# Patient Record
Sex: MALE | Race: OTHER | HISPANIC OR LATINO | Employment: FULL TIME | URBAN - METROPOLITAN AREA
[De-identification: names, ages, dates, MRNs, and addresses within clinical notes are randomized per-mention and may not be internally consistent; named-entity substitution may affect disease eponyms.]

---

## 2018-06-03 ENCOUNTER — OFFICE VISIT (OUTPATIENT)
Dept: URGENT CARE | Facility: CLINIC | Age: 43
End: 2018-06-03

## 2018-06-03 VITALS
SYSTOLIC BLOOD PRESSURE: 126 MMHG | TEMPERATURE: 96.4 F | HEART RATE: 84 BPM | DIASTOLIC BLOOD PRESSURE: 80 MMHG | OXYGEN SATURATION: 98 %

## 2018-06-03 DIAGNOSIS — S61.219A LACERATION WITHOUT FOREIGN BODY OF UNSPECIFIED FINGER WITHOUT DAMAGE TO NAIL, INITIAL ENCOUNTER: Primary | ICD-10-CM

## 2018-06-03 PROCEDURE — 90715 TDAP VACCINE 7 YRS/> IM: CPT

## 2018-06-03 PROCEDURE — 13133 CMPLX RPR F/C/C/M/N/AX/G/H/F: CPT | Performed by: PHYSICIAN ASSISTANT

## 2018-06-03 PROCEDURE — 99204 OFFICE O/P NEW MOD 45 MIN: CPT | Performed by: PHYSICIAN ASSISTANT

## 2018-06-03 PROCEDURE — 13132 CMPLX RPR F/C/C/M/N/AX/G/H/F: CPT | Performed by: PHYSICIAN ASSISTANT

## 2018-06-03 RX ORDER — CEPHALEXIN 500 MG/1
500 CAPSULE ORAL EVERY 8 HOURS SCHEDULED
Qty: 21 CAPSULE | Refills: 0 | Status: SHIPPED | OUTPATIENT
Start: 2018-06-03 | End: 2018-06-10

## 2018-06-03 NOTE — PATIENT INSTRUCTIONS
Keep the stitches clean and dry  Do not get the stitches wet for the 1st 24 hours  The stitches should be covered with a clean dressing daily and a small coating of antibiotic ointment  Watch for signs of infection such as increased swelling increased redness pus from the wound or red streaking  Return in 24 hr for wound check  Start antibiotic and take as directed  Stitches are to be removed in 7-10 days  If you have increased pain in your finger go to the emergency room

## 2018-06-03 NOTE — PROGRESS NOTES
[facility]    NAME: Sanjeev  is a 38 y o  male  : 1975    MRN: [id number]  DATE: Chloé 3, 2018  TIME: 8:32 PM    Assessment and Plan   Laceration without foreign body of unspecified finger without damage to nail, initial encounter [S61 [id number]]  1  Laceration without foreign body of unspecified finger without damage to nail, initial encounter  TDAP Vaccine greater than or equal to 6yo    cephalexin (KEFLEX) 500 mg capsule   Laceration repair  Date/Time: 6/3/2018 6:59 PM  Performed by: aGmal Montalvo  Authorized by: Gamal Montalvo   Consent: Verbal consent obtained  Consent given by: patient  Patient understanding: patient states understanding of the procedure being performed  Patient identity confirmed: verbally with patient  Body area: upper extremity  Location details: right index finger  Laceration length: 9 cm  Foreign bodies: no foreign bodies  Tendon involvement: none  Nerve involvement: none  Vascular damage: no    Anesthesia:  Local Anesthetic: lidocaine 2% without epinephrine  Anesthetic total: 4 mL    Sedation:  Patient sedated: no      Procedure Details:  Preparation: Patient was prepped and draped in the usual sterile fashion  Irrigation solution: saline  Amount of cleaning: standard (with betadine)  Debridement: none  Degree of undermining: none  Skin closure: 5-0 nylon  Number of sutures: 13  Technique: simple  Approximation: close  Approximation difficulty: complex  Dressing: antibiotic ointment and gauze roll (telfa)  Patient tolerance: Patient tolerated the procedure well with no immediate complications                Patient Instructions     Patient Instructions   Keep the stitches clean and dry  Do not get the stitches wet for the 1st 24 hours  The stitches should be covered with a clean dressing daily and a small coating of antibiotic ointment  Watch for signs of infection such as increased swelling increased redness pus from the wound or red streaking    Return in 24 hr for wound check  Start antibiotic and take as directed  Stitches are to be removed in 7-10 days  If you have increased pain in your finger go to the emergency room  Chief Complaint     Chief Complaint   Patient presents with    Finger Laceration     right ring finger cut on metal he was cleaning about 30 min ago       History of Present Illness   46-year-old male here with laceration to his right pointer finger  Patient was cleaning a metal object and cut his finger  Has full range of motion of finger  Denies any numbness  Review of Systems   Review of Systems   Constitutional: Negative for activity change, appetite change, chills, diaphoresis, fatigue, fever and unexpected weight change  HENT: Negative for congestion, ear pain, hearing loss, sinus pressure, sneezing, sore throat and tinnitus  Eyes: Negative for photophobia, redness and visual disturbance  Respiratory: Negative for apnea, cough, chest tightness, shortness of breath, wheezing and stridor  Cardiovascular: Negative for chest pain, palpitations and leg swelling  Gastrointestinal: Negative for abdominal distention, abdominal pain, blood in stool, constipation, diarrhea, nausea and vomiting  Endocrine: Negative for cold intolerance, heat intolerance, polydipsia, polyphagia and polyuria  Genitourinary: Negative for difficulty urinating, dysuria, flank pain, hematuria and urgency  Musculoskeletal: Negative for arthralgias, back pain, gait problem, myalgias, neck pain and neck stiffness  Skin: Positive for wound  Negative for rash  Neurological: Negative for dizziness, tremors, seizures, syncope, weakness, light-headedness, numbness and headaches  Hematological: Negative for adenopathy  Does not bruise/bleed easily  Psychiatric/Behavioral: Negative for agitation, behavioral problems, confusion and decreased concentration  The patient is not nervous/anxious      All other systems reviewed and are negative  Current Medications     Current Outpatient Prescriptions:     cephalexin (KEFLEX) 500 mg capsule, Take 1 capsule (500 mg total) by mouth every 8 (eight) hours for 7 days, Disp: 21 capsule, Rfl: 0    Current Allergies     Allergies as of 06/03/2018    (No Known Allergies)          The following portions of the patient's history were reviewed and updated as appropriate: allergies, current medications, past family history, past medical history, past social history, past surgical history and problem list    No past medical history on file  No past surgical history on file  No family history on file  Medications have been verified  Objective   /80   Pulse 84   Temp (!) 96 4 °F (35 8 °C) (Tympanic)   SpO2 98%      Physical Exam   Physical Exam   Constitutional: He appears well-developed and well-nourished  Cardiovascular: Normal rate and regular rhythm  Pulmonary/Chest: Effort normal    Musculoskeletal:        Arms:  Skin:   Laceration does not extend into nail bed

## 2018-06-04 ENCOUNTER — OFFICE VISIT (OUTPATIENT)
Dept: URGENT CARE | Facility: CLINIC | Age: 43
End: 2018-06-04

## 2018-06-04 VITALS
HEART RATE: 83 BPM | TEMPERATURE: 97.4 F | SYSTOLIC BLOOD PRESSURE: 141 MMHG | OXYGEN SATURATION: 98 % | DIASTOLIC BLOOD PRESSURE: 86 MMHG

## 2018-06-04 DIAGNOSIS — Z51.89 ENCOUNTER FOR WOUND RE-CHECK: Primary | ICD-10-CM

## 2018-06-04 PROCEDURE — 99213 OFFICE O/P EST LOW 20 MIN: CPT | Performed by: NURSE PRACTITIONER

## 2018-06-04 NOTE — PROGRESS NOTES
S- 82-year-old male here for wound check  Status post laceration repair of right 4th finger  He is denying pain  Denies, drainage or swelling  O- Laceration right 4th finger is nicely approximated  No swelling, redness, or drainage noted  A/P- S/P laceration repair- area cleansed with wound   Bacitracin and DSD re-applied  Follow up for wound-check in 2 days

## 2018-06-04 NOTE — PATIENT INSTRUCTIONS
Acute Wound Care   GANGA Cornell & OSMIN Starr: Skin manifestations of running  J Am Acad Dermatol, 2006; 55(2):290-301  JULIANO Aviles, KANE Strickland, & CARLIE Mckenzie: Wound healing: an overview  Plast Reconstr Surg, 2006; 117(7 Suppl):1e-32e  GANGA Jane: Preventing Pressure Ulcers and Skin Tears  In: Yudi Nixon , eds  Geriatric Nursing Protocols for Best Practice  [place], [place]: [employer];, 2003  Navya Carrillo R, et al: The clinical efficacy and cost effectiveness of the vacuum-assisted closure technique in the management of acute and chronic wounds: a randomized controlled trial  Plast Reconstr Surg, 2006; 118(2):390-397  ArchieS, KANE Paige, & RomainKG: Recent advances and emerging treatments  BMJ, 2006; 332(3642):962-965  LETTY Smith & TuanWT: Wound dressings  Surg Clin Trino Blade, 2003; 61(4):061-783  PRODIGY: Lacerations  Available at: https://Tok[employer]/  nhs uk/lacerations/view_whole_guidance , Accessed September 27, 2006  Small,V: Management of cuts, abrasions and lacerations  Nurs Stand, 2000; 15(5):41-44  © 2016 1872 Tawnya Marroquin is for End User's use only and may not be sold, redistributed or otherwise used for commercial purposes  All illustrations and images included in CareNotes® are the copyrighted property of [employer]  or [employer]  The above information is an  only  It is not intended as medical advice for individual conditions or treatments  Talk to your doctor, nurse or pharmacist before following any medical regimen to see if it is safe and effective for you